# Patient Record
Sex: MALE | Race: WHITE | URBAN - METROPOLITAN AREA
[De-identification: names, ages, dates, MRNs, and addresses within clinical notes are randomized per-mention and may not be internally consistent; named-entity substitution may affect disease eponyms.]

---

## 2017-10-11 ENCOUNTER — APPOINTMENT (OUTPATIENT)
Dept: URBAN - METROPOLITAN AREA SURGERY 9 | Age: 82
Setting detail: DERMATOLOGY
End: 2017-10-11

## 2017-10-11 DIAGNOSIS — L57.8 OTHER SKIN CHANGES DUE TO CHRONIC EXPOSURE TO NONIONIZING RADIATION: ICD-10-CM

## 2017-10-11 PROBLEM — C44.329 SQUAMOUS CELL CARCINOMA OF SKIN OF OTHER PARTS OF FACE: Status: ACTIVE | Noted: 2017-10-11

## 2017-10-11 PROBLEM — Z85.828 PERSONAL HISTORY OF OTHER MALIGNANT NEOPLASM OF SKIN: Status: ACTIVE | Noted: 2017-10-11

## 2017-10-11 PROBLEM — C44.311 BASAL CELL CARCINOMA OF SKIN OF NOSE: Status: ACTIVE | Noted: 2017-10-11

## 2017-10-11 PROCEDURE — OTHER COUNSELING: OTHER

## 2017-10-11 PROCEDURE — 17311 MOHS 1 STAGE H/N/HF/G: CPT | Mod: 76

## 2017-10-11 PROCEDURE — OTHER MOHS SURGERY: OTHER

## 2017-10-11 PROCEDURE — 17312 MOHS ADDL STAGE: CPT

## 2017-10-11 PROCEDURE — OTHER RETURN TO REFERRING PROVIDER: OTHER

## 2017-10-11 PROCEDURE — OTHER PRESCRIPTION: OTHER

## 2017-10-11 PROCEDURE — 99202 OFFICE O/P NEW SF 15 MIN: CPT | Mod: 25

## 2017-10-11 PROCEDURE — 14060 TIS TRNFR E/N/E/L 10 SQ CM/<: CPT

## 2017-10-11 PROCEDURE — OTHER CONSULTATION FOR MOHS SURGERY: OTHER

## 2017-10-11 PROCEDURE — 15260 FTH/GFT FR N/E/E/L 20 SQCM/<: CPT

## 2017-10-11 PROCEDURE — 17311 MOHS 1 STAGE H/N/HF/G: CPT

## 2017-10-11 PROCEDURE — 13132 CMPLX RPR F/C/C/M/N/AX/G/H/F: CPT | Mod: 59

## 2017-10-11 RX ORDER — DOXYCYCLINE HYCLATE 100 MG/1
CAPSULE, GELATIN COATED ORAL TWICE DAILY
Qty: 14 | Refills: 0 | Status: ERX | COMMUNITY
Start: 2017-10-11

## 2017-10-11 ASSESSMENT — LOCATION SIMPLE DESCRIPTION DERM: LOCATION SIMPLE: RIGHT CHEEK

## 2017-10-11 ASSESSMENT — LOCATION ZONE DERM: LOCATION ZONE: FACE

## 2017-10-11 ASSESSMENT — LOCATION DETAILED DESCRIPTION DERM: LOCATION DETAILED: RIGHT INFERIOR CENTRAL MALAR CHEEK

## 2017-10-11 NOTE — PROCEDURE: CONSULTATION FOR MOHS SURGERY
Detail Level: Detailed
Incorporate Mauc In Note: No
X Size Of Lesion In Cm (Optional): 0
Size Of Lesion: -

## 2017-10-11 NOTE — PROCEDURE: MOHS SURGERY
Body Location Override (Optional - Billing Will Still Be Based On Selected Body Map Location If Applicable): left anterior nasal ala

## 2017-10-11 NOTE — PROCEDURE: MOHS SURGERY
Post-Care Instructions: Written and verbal instructions for wound care for taped wound care/nylon suture reviewed with patient

## 2017-10-11 NOTE — PROCEDURE: MOHS SURGERY
Body Location Override (Optional - Billing Will Still Be Based On Selected Body Map Location If Applicable): right mid cheek

## 2019-09-18 ENCOUNTER — HOSPITAL ENCOUNTER (INPATIENT)
Age: 84
LOS: 2 days | Discharge: HOME | DRG: 301 | End: 2019-09-20
Attending: EMERGENCY MEDICINE | Admitting: PSYCHIATRY & NEUROLOGY
Payer: MEDICARE

## 2019-09-18 ENCOUNTER — APPOINTMENT (OUTPATIENT)
Dept: CT IMAGING | Age: 84
DRG: 301 | End: 2019-09-18
Payer: MEDICARE

## 2019-09-18 DIAGNOSIS — I77.71 INTERNAL CAROTID ARTERY DISSECTION (HCC): ICD-10-CM

## 2019-09-18 DIAGNOSIS — H53.2 DIPLOPIA: Primary | ICD-10-CM

## 2019-09-18 LAB
% CKMB: 4.8 % (ref 0–3.5)
ABSOLUTE EOS #: 0.15 K/UL (ref 0–0.44)
ABSOLUTE IMMATURE GRANULOCYTE: 0.03 K/UL (ref 0–0.3)
ABSOLUTE LYMPH #: 1.39 K/UL (ref 1.1–3.7)
ABSOLUTE MONO #: 0.68 K/UL (ref 0.1–1.2)
ANION GAP SERPL CALCULATED.3IONS-SCNC: 14 MMOL/L (ref 9–17)
BASOPHILS # BLD: 1 % (ref 0–2)
BASOPHILS ABSOLUTE: 0.05 K/UL (ref 0–0.2)
BUN BLDV-MCNC: 22 MG/DL (ref 8–23)
BUN/CREAT BLD: ABNORMAL (ref 9–20)
CALCIUM SERPL-MCNC: 8.8 MG/DL (ref 8.6–10.4)
CHLORIDE BLD-SCNC: 104 MMOL/L (ref 98–107)
CK MB: 3.7 NG/ML
CKMB INTERPRETATION: ABNORMAL
CO2: 22 MMOL/L (ref 20–31)
CREAT SERPL-MCNC: 0.55 MG/DL (ref 0.7–1.2)
DIFFERENTIAL TYPE: ABNORMAL
EOSINOPHILS RELATIVE PERCENT: 3 % (ref 1–4)
GFR AFRICAN AMERICAN: >60 ML/MIN
GFR NON-AFRICAN AMERICAN: >60 ML/MIN
GFR SERPL CREATININE-BSD FRML MDRD: ABNORMAL ML/MIN/{1.73_M2}
GFR SERPL CREATININE-BSD FRML MDRD: ABNORMAL ML/MIN/{1.73_M2}
GLUCOSE BLD-MCNC: 98 MG/DL (ref 70–99)
HCT VFR BLD CALC: 36.4 % (ref 40.7–50.3)
HEMOGLOBIN: 11.6 G/DL (ref 13–17)
IMMATURE GRANULOCYTES: 1 %
INR BLD: 3.1
LYMPHOCYTES # BLD: 23 % (ref 24–43)
MCH RBC QN AUTO: 31.9 PG (ref 25.2–33.5)
MCHC RBC AUTO-ENTMCNC: 31.9 G/DL (ref 28.4–34.8)
MCV RBC AUTO: 100 FL (ref 82.6–102.9)
MONOCYTES # BLD: 11 % (ref 3–12)
MYOGLOBIN: 50 NG/ML (ref 28–72)
NRBC AUTOMATED: 0 PER 100 WBC
PARTIAL THROMBOPLASTIN TIME: 34.3 SEC (ref 20.5–30.5)
PDW BLD-RTO: 15.2 % (ref 11.8–14.4)
PLATELET # BLD: 194 K/UL (ref 138–453)
PLATELET ESTIMATE: ABNORMAL
PMV BLD AUTO: 9.9 FL (ref 8.1–13.5)
POTASSIUM SERPL-SCNC: 4.1 MMOL/L (ref 3.7–5.3)
PROTHROMBIN TIME: 30.1 SEC (ref 9–12)
RBC # BLD: 3.64 M/UL (ref 4.21–5.77)
RBC # BLD: ABNORMAL 10*6/UL
SEG NEUTROPHILS: 61 % (ref 36–65)
SEGMENTED NEUTROPHILS ABSOLUTE COUNT: 3.77 K/UL (ref 1.5–8.1)
SODIUM BLD-SCNC: 140 MMOL/L (ref 135–144)
TOTAL CK: 77 U/L (ref 39–308)
TROPONIN INTERP: ABNORMAL
TROPONIN T: ABNORMAL NG/ML
TROPONIN, HIGH SENSITIVITY: 66 NG/L (ref 0–22)
WBC # BLD: 6.1 K/UL (ref 3.5–11.3)
WBC # BLD: ABNORMAL 10*3/UL

## 2019-09-18 PROCEDURE — 99285 EMERGENCY DEPT VISIT HI MDM: CPT

## 2019-09-18 PROCEDURE — 93005 ELECTROCARDIOGRAM TRACING: CPT | Performed by: STUDENT IN AN ORGANIZED HEALTH CARE EDUCATION/TRAINING PROGRAM

## 2019-09-18 PROCEDURE — 85730 THROMBOPLASTIN TIME PARTIAL: CPT

## 2019-09-18 PROCEDURE — 80048 BASIC METABOLIC PNL TOTAL CA: CPT

## 2019-09-18 PROCEDURE — 99222 1ST HOSP IP/OBS MODERATE 55: CPT | Performed by: PSYCHIATRY & NEUROLOGY

## 2019-09-18 PROCEDURE — 85025 COMPLETE CBC W/AUTO DIFF WBC: CPT

## 2019-09-18 PROCEDURE — 82550 ASSAY OF CK (CPK): CPT

## 2019-09-18 PROCEDURE — 84484 ASSAY OF TROPONIN QUANT: CPT

## 2019-09-18 PROCEDURE — 82553 CREATINE MB FRACTION: CPT

## 2019-09-18 PROCEDURE — 2060000000 HC ICU INTERMEDIATE R&B

## 2019-09-18 PROCEDURE — 85610 PROTHROMBIN TIME: CPT

## 2019-09-18 PROCEDURE — 83874 ASSAY OF MYOGLOBIN: CPT

## 2019-09-18 PROCEDURE — 70450 CT HEAD/BRAIN W/O DYE: CPT

## 2019-09-19 ENCOUNTER — APPOINTMENT (OUTPATIENT)
Dept: MRI IMAGING | Age: 84
DRG: 301 | End: 2019-09-19
Payer: MEDICARE

## 2019-09-19 LAB
CHOLESTEROL/HDL RATIO: 2.2
CHOLESTEROL: 100 MG/DL
EKG ATRIAL RATE: 394 BPM
EKG Q-T INTERVAL: 414 MS
EKG QRS DURATION: 114 MS
EKG QTC CALCULATION (BAZETT): 423 MS
EKG R AXIS: -52 DEGREES
EKG T AXIS: 62 DEGREES
EKG VENTRICULAR RATE: 63 BPM
ESTIMATED AVERAGE GLUCOSE: 114 MG/DL
HBA1C MFR BLD: 5.6 % (ref 4–6)
HCT VFR BLD CALC: 34.1 % (ref 40.7–50.3)
HDLC SERPL-MCNC: 45 MG/DL
HEMOGLOBIN: 10.7 G/DL (ref 13–17)
INR BLD: 3.5
LDL CHOLESTEROL: 38 MG/DL (ref 0–130)
MCH RBC QN AUTO: 32.3 PG (ref 25.2–33.5)
MCHC RBC AUTO-ENTMCNC: 31.4 G/DL (ref 28.4–34.8)
MCV RBC AUTO: 103 FL (ref 82.6–102.9)
NRBC AUTOMATED: 0 PER 100 WBC
PDW BLD-RTO: 15.2 % (ref 11.8–14.4)
PLATELET # BLD: 170 K/UL (ref 138–453)
PMV BLD AUTO: 9.9 FL (ref 8.1–13.5)
PROTHROMBIN TIME: 33.5 SEC (ref 9–12)
RBC # BLD: 3.31 M/UL (ref 4.21–5.77)
TRIGL SERPL-MCNC: 84 MG/DL
VLDLC SERPL CALC-MCNC: NORMAL MG/DL (ref 1–30)
WBC # BLD: 4.4 K/UL (ref 3.5–11.3)

## 2019-09-19 PROCEDURE — 99233 SBSQ HOSP IP/OBS HIGH 50: CPT | Performed by: PSYCHIATRY & NEUROLOGY

## 2019-09-19 PROCEDURE — 83036 HEMOGLOBIN GLYCOSYLATED A1C: CPT

## 2019-09-19 PROCEDURE — 85027 COMPLETE CBC AUTOMATED: CPT

## 2019-09-19 PROCEDURE — 80061 LIPID PANEL: CPT

## 2019-09-19 PROCEDURE — 70551 MRI BRAIN STEM W/O DYE: CPT

## 2019-09-19 PROCEDURE — 6370000000 HC RX 637 (ALT 250 FOR IP): Performed by: STUDENT IN AN ORGANIZED HEALTH CARE EDUCATION/TRAINING PROGRAM

## 2019-09-19 PROCEDURE — 70544 MR ANGIOGRAPHY HEAD W/O DYE: CPT

## 2019-09-19 PROCEDURE — 2060000000 HC ICU INTERMEDIATE R&B

## 2019-09-19 PROCEDURE — 97166 OT EVAL MOD COMPLEX 45 MIN: CPT

## 2019-09-19 PROCEDURE — 70547 MR ANGIOGRAPHY NECK W/O DYE: CPT

## 2019-09-19 PROCEDURE — 99223 1ST HOSP IP/OBS HIGH 75: CPT | Performed by: PSYCHIATRY & NEUROLOGY

## 2019-09-19 PROCEDURE — 36415 COLL VENOUS BLD VENIPUNCTURE: CPT

## 2019-09-19 PROCEDURE — 97530 THERAPEUTIC ACTIVITIES: CPT

## 2019-09-19 PROCEDURE — 97162 PT EVAL MOD COMPLEX 30 MIN: CPT

## 2019-09-19 PROCEDURE — 85610 PROTHROMBIN TIME: CPT

## 2019-09-19 PROCEDURE — 2580000003 HC RX 258: Performed by: STUDENT IN AN ORGANIZED HEALTH CARE EDUCATION/TRAINING PROGRAM

## 2019-09-19 PROCEDURE — 97535 SELF CARE MNGMENT TRAINING: CPT

## 2019-09-19 PROCEDURE — 93010 ELECTROCARDIOGRAM REPORT: CPT | Performed by: INTERNAL MEDICINE

## 2019-09-19 RX ORDER — SODIUM CHLORIDE 0.9 % (FLUSH) 0.9 %
10 SYRINGE (ML) INJECTION PRN
Status: DISCONTINUED | OUTPATIENT
Start: 2019-09-19 | End: 2019-09-20 | Stop reason: HOSPADM

## 2019-09-19 RX ORDER — QUINAPRIL 10 MG/1
10 TABLET ORAL NIGHTLY
Status: DISCONTINUED | OUTPATIENT
Start: 2019-09-19 | End: 2019-09-19

## 2019-09-19 RX ORDER — FUROSEMIDE 20 MG/1
20 TABLET ORAL DAILY
Status: DISCONTINUED | OUTPATIENT
Start: 2019-09-19 | End: 2019-09-20 | Stop reason: HOSPADM

## 2019-09-19 RX ORDER — ATORVASTATIN CALCIUM 10 MG/1
10 TABLET, FILM COATED ORAL DAILY
Status: DISCONTINUED | OUTPATIENT
Start: 2019-09-19 | End: 2019-09-20 | Stop reason: HOSPADM

## 2019-09-19 RX ORDER — CHLORAL HYDRATE 500 MG
2000 CAPSULE ORAL 3 TIMES DAILY
Status: DISCONTINUED | OUTPATIENT
Start: 2019-09-19 | End: 2019-09-19

## 2019-09-19 RX ORDER — ASPIRIN 81 MG/1
81 TABLET, CHEWABLE ORAL DAILY
Status: DISCONTINUED | OUTPATIENT
Start: 2019-09-20 | End: 2019-09-20 | Stop reason: HOSPADM

## 2019-09-19 RX ORDER — LANOLIN ALCOHOL/MO/W.PET/CERES
325 CREAM (GRAM) TOPICAL
Status: DISCONTINUED | OUTPATIENT
Start: 2019-09-19 | End: 2019-09-20 | Stop reason: HOSPADM

## 2019-09-19 RX ORDER — LISINOPRIL 5 MG/1
5 TABLET ORAL NIGHTLY
Status: DISCONTINUED | OUTPATIENT
Start: 2019-09-19 | End: 2019-09-20 | Stop reason: HOSPADM

## 2019-09-19 RX ORDER — WARFARIN SODIUM 1 MG/1
1 TABLET ORAL
Status: DISCONTINUED | OUTPATIENT
Start: 2019-09-19 | End: 2019-09-19 | Stop reason: DRUGHIGH

## 2019-09-19 RX ORDER — M-VIT,TX,IRON,MINS/CALC/FOLIC 27MG-0.4MG
1 TABLET ORAL DAILY
Status: DISCONTINUED | OUTPATIENT
Start: 2019-09-19 | End: 2019-09-20 | Stop reason: HOSPADM

## 2019-09-19 RX ORDER — METOPROLOL SUCCINATE 25 MG/1
25 TABLET, EXTENDED RELEASE ORAL DAILY
Status: DISCONTINUED | OUTPATIENT
Start: 2019-09-19 | End: 2019-09-20 | Stop reason: HOSPADM

## 2019-09-19 RX ORDER — ONDANSETRON 2 MG/ML
4 INJECTION INTRAMUSCULAR; INTRAVENOUS EVERY 6 HOURS PRN
Status: DISCONTINUED | OUTPATIENT
Start: 2019-09-19 | End: 2019-09-20 | Stop reason: HOSPADM

## 2019-09-19 RX ORDER — WARFARIN SODIUM 2 MG/1
2 TABLET ORAL
Status: DISCONTINUED | OUTPATIENT
Start: 2019-09-20 | End: 2019-09-19 | Stop reason: DRUGHIGH

## 2019-09-19 RX ORDER — SODIUM CHLORIDE 0.9 % (FLUSH) 0.9 %
10 SYRINGE (ML) INJECTION EVERY 12 HOURS SCHEDULED
Status: DISCONTINUED | OUTPATIENT
Start: 2019-09-19 | End: 2019-09-20 | Stop reason: HOSPADM

## 2019-09-19 RX ORDER — DIGOXIN 250 MCG
250 TABLET ORAL DAILY
Status: DISCONTINUED | OUTPATIENT
Start: 2019-09-19 | End: 2019-09-20 | Stop reason: HOSPADM

## 2019-09-19 RX ADMIN — SODIUM CHLORIDE, PRESERVATIVE FREE 10 ML: 5 INJECTION INTRAVENOUS at 22:04

## 2019-09-19 RX ADMIN — ATORVASTATIN CALCIUM 10 MG: 10 TABLET, FILM COATED ORAL at 08:27

## 2019-09-19 RX ADMIN — LISINOPRIL 5 MG: 5 TABLET ORAL at 22:07

## 2019-09-19 RX ADMIN — MULTIPLE VITAMINS W/ MINERALS TAB 1 TABLET: TAB at 08:27

## 2019-09-19 RX ADMIN — FUROSEMIDE 20 MG: 20 TABLET ORAL at 08:27

## 2019-09-19 RX ADMIN — FERROUS SULFATE TAB EC 325 MG (65 MG FE EQUIVALENT) 325 MG: 325 (65 FE) TABLET DELAYED RESPONSE at 08:27

## 2019-09-19 ASSESSMENT — PAIN SCALES - GENERAL
PAINLEVEL_OUTOF10: 0

## 2019-09-19 ASSESSMENT — ENCOUNTER SYMPTOMS
SHORTNESS OF BREATH: 0
COUGH: 0
EYE PAIN: 0
VOMITING: 0
SORE THROAT: 0
PHOTOPHOBIA: 0
RHINORRHEA: 0
EYE REDNESS: 0
ABDOMINAL PAIN: 0
GASTROINTESTINAL NEGATIVE: 1
DIARRHEA: 0
BACK PAIN: 0
NAUSEA: 0
RESPIRATORY NEGATIVE: 1
CONSTIPATION: 0

## 2019-09-19 ASSESSMENT — VISUAL ACUITY: VA_NORMAL: 1

## 2019-09-19 NOTE — CONSULTS
Endovascular Note    []   Stroke Alert          []   Stroke Priority          [x]    Stroke Consult     9/19/2019 4:27 AM    Pt Name: Jennifer Kellogg  MRN: [id number]  YOB: 1934  Date of evaluation: 9/19/2019  Primary Care Physician: DO Alex Fernandez Renato Weber is a 80 y.o. male who presents with past medical history of htn, hyperlipidemia, history of blood clots, back pain, A. fib came as a transfer from [facility] to acute onset of double vision. Patient states that he was gardening in the morning when he noticed double vision and went to the hospital.  [facility] which showed carotid dissection patient was transferred to [facility] for further management. Patient is on warfarin for A. fib with INR 3.4. On exam patient states that he does still experience double vision which is present all the time when he looks far away, he describes seeing two instead of one however no diplopia is seen on nearby objects. Patient denies any numbness tingling facial droop slurred speech, headache or gait problems.                       Allergies  has No Known Allergies.     Medications  Home Medications           Prior to Admission medications    Medication Sig Start Date End Date Taking?  Authorizing Provider   warfarin (COUMADIN) 5 MG injection Take 5 mg by mouth daily       Historical Provider, MD   atorvastatin (LIPITOR) 10 MG tablet Take 10 mg by mouth daily       Historical Provider, MD   metoprolol (TOPROL-XL) 25 MG XL tablet Take 25 mg by mouth daily       Historical Provider, MD   furosemide (LASIX) 20 MG tablet Take 20 mg by mouth daily       Historical Provider, MD   digoxin (LANOXIN) 250 MCG tablet Take 250 mcg by mouth daily       Historical Provider, MD   Omega-3 Fatty Acids (FISH OIL) 1000 MG CAPS Take 2,000 mg by mouth 3 times daily       Historical Provider, MD   Multiple Vitamins-Minerals (THERAPEUTIC MULTIVITAMIN-MINERALS) tablet Take 1 tablet by degrees for full 10 seconds  5b. Motor right arm:  0 - no drift, limb holds 90 (or 45) degrees for full 10 seconds  6a. Motor left le - no drift; leg holds 30 degree position for full 5 seconds  6b. Motor right le - no drift; leg holds 30 degree position for full 5 seconds  7. Limb Ataxia:  0 - absent  8. Sensory:  0 - normal; no sensory loss  9. Best Language:  0 - no aphasia, normal  10. Dysarthria:  0 - normal  11. Extinction and Inattention:  0 - no abnormality     TOTAL:  0        ABCD2 Score  (Estimate Risk of Stroke after TIA)   POINTS   Age     < 60   ? 60       [] 0  [x] 1   BP:      SBP <140 or DBP < 90   SBP ? 140 or DBP ? 90         [] 0  [x] 1   Clinical Features of TIA      Other Symptoms                 Speech Disturbances W/O Weakness   Unilateral Weakness       [x] 0  [] 1  [] 2   Duration of symptoms      < 10 Minutes                                     10-59 Minutes   ?  61 Minutes       [] 0  [] 1  [x] 2   Diabetes      No                    Yes       [x] 0  [] 1   TOTAL 4   0-3 Points: Low Risk -> Work up could be done OPD              2-Day Stroke Risk: 1%              7- Day Stroke Risk: 1.2%              90 Days Stroke Risk: 3.1%     4-5 Points: Moderate Risk              2-Day Stroke Risk: 4.1%              7- Day Stroke Risk: 5.9%               90 Days Stroke Risk: 9.8%     6-7 Points: High Risk -> Warrant Admission for Workup              2-Day Stroke Risk: 8.1%              7- Day Stroke Risk: 11.7%              90 Days Stroke Risk: 17.8%           Imaging:  CT brain without contrast:  ordered  CTA head/neck with and without contrast:  Done at outlying facility was positive for internal carotid artery dissection  MRI brain without contrast (limited stroke protocol):  ordered        ASSESSMENT RECOMMENDATIONS:  54-year-old male with past medical history of htn, hyperlipidemia, history of blood clots, back pain, A. fib came as a transfer from [facility] to [facility]

## 2019-09-19 NOTE — ED NOTES
Dr. Khan Bending and Dr. Eduardo Quiñonez at bedside     Cathy Mccallum, [address] [facility]  09/18/19 3777

## 2019-09-19 NOTE — ED PROVIDER NOTES
Emergency Medicine Attending Note    I have seen and examined the patient in conjunction with the Resident/MLP. Please see my key portion documented:      I agree with the assessment and plan as discussed with Dr. Mj Chaney. Electronically signed:  Ludin Luz M.D.            Merlin Bean MD  09/18/19 3531

## 2019-09-19 NOTE — ED NOTES
Critical troponin of 66 received from lab. Dr. Ashley Holt informed.       Haze Gottron, RN  09/18/19 2030

## 2019-09-20 VITALS
RESPIRATION RATE: 17 BRPM | SYSTOLIC BLOOD PRESSURE: 124 MMHG | WEIGHT: 185.85 LBS | DIASTOLIC BLOOD PRESSURE: 55 MMHG | HEIGHT: 72 IN | BODY MASS INDEX: 25.17 KG/M2 | HEART RATE: 56 BPM | OXYGEN SATURATION: 96 % | TEMPERATURE: 97.9 F

## 2019-09-20 LAB
INR BLD: 2.2
PROTHROMBIN TIME: 22.1 SEC (ref 9–12)

## 2019-09-20 PROCEDURE — 2580000003 HC RX 258: Performed by: STUDENT IN AN ORGANIZED HEALTH CARE EDUCATION/TRAINING PROGRAM

## 2019-09-20 PROCEDURE — 85610 PROTHROMBIN TIME: CPT

## 2019-09-20 PROCEDURE — 97530 THERAPEUTIC ACTIVITIES: CPT

## 2019-09-20 PROCEDURE — 6370000000 HC RX 637 (ALT 250 FOR IP): Performed by: STUDENT IN AN ORGANIZED HEALTH CARE EDUCATION/TRAINING PROGRAM

## 2019-09-20 PROCEDURE — 97116 GAIT TRAINING THERAPY: CPT

## 2019-09-20 PROCEDURE — G0008 ADMIN INFLUENZA VIRUS VAC: HCPCS | Performed by: PSYCHIATRY & NEUROLOGY

## 2019-09-20 PROCEDURE — 90686 IIV4 VACC NO PRSV 0.5 ML IM: CPT | Performed by: PSYCHIATRY & NEUROLOGY

## 2019-09-20 PROCEDURE — 97110 THERAPEUTIC EXERCISES: CPT

## 2019-09-20 PROCEDURE — 36415 COLL VENOUS BLD VENIPUNCTURE: CPT

## 2019-09-20 PROCEDURE — 99239 HOSP IP/OBS DSCHRG MGMT >30: CPT | Performed by: NURSE PRACTITIONER

## 2019-09-20 PROCEDURE — 6360000002 HC RX W HCPCS: Performed by: PSYCHIATRY & NEUROLOGY

## 2019-09-20 RX ORDER — ASPIRIN 81 MG/1
81 TABLET, CHEWABLE ORAL DAILY
Qty: 28 TABLET | Refills: 0 | Status: SHIPPED | OUTPATIENT
Start: 2019-09-21

## 2019-09-20 RX ORDER — WARFARIN SODIUM 2 MG/1
2 TABLET ORAL
Status: COMPLETED | OUTPATIENT
Start: 2019-09-20 | End: 2019-09-20

## 2019-09-20 RX ORDER — WARFARIN SODIUM 2 MG/1
TABLET ORAL
Qty: 12 TABLET | Refills: 5 | Status: SHIPPED | OUTPATIENT
Start: 2019-09-20

## 2019-09-20 RX ORDER — WARFARIN SODIUM 1 MG/1
TABLET ORAL
Qty: 32 TABLET | Refills: 3 | Status: SHIPPED | OUTPATIENT
Start: 2019-09-20

## 2019-09-20 RX ADMIN — WARFARIN SODIUM 2 MG: 2 TABLET ORAL at 17:05

## 2019-09-20 RX ADMIN — ASPIRIN 81 MG: 81 TABLET, CHEWABLE ORAL at 08:28

## 2019-09-20 RX ADMIN — ATORVASTATIN CALCIUM 10 MG: 10 TABLET, FILM COATED ORAL at 08:29

## 2019-09-20 RX ADMIN — MULTIPLE VITAMINS W/ MINERALS TAB 1 TABLET: TAB at 08:28

## 2019-09-20 RX ADMIN — SODIUM CHLORIDE, PRESERVATIVE FREE 10 ML: 5 INJECTION INTRAVENOUS at 08:34

## 2019-09-20 RX ADMIN — INFLUENZA A VIRUS A/MICHIGAN/45/2015 X-275 (H1N1) ANTIGEN (FORMALDEHYDE INACTIVATED), INFLUENZA A VIRUS A/SINGAPORE/INFIMH-16-0019/2016 IVR-186 (H3N2) ANTIGEN (FORMALDEHYDE INACTIVATED), INFLUENZA B VIRUS B/PHUKET/3073/2013 ANTIGEN (FORMALDEHYDE INACTIVATED), AND INFLUENZA B VIRUS B/MARYLAND/15/2016 BX-69A ANTIGEN (FORMALDEHYDE INACTIVATED) 0.5 ML: 15; 15; 15; 15 INJECTION, SUSPENSION INTRAMUSCULAR at 08:29

## 2019-09-20 RX ADMIN — FERROUS SULFATE TAB EC 325 MG (65 MG FE EQUIVALENT) 325 MG: 325 (65 FE) TABLET DELAYED RESPONSE at 08:29

## 2019-09-20 ASSESSMENT — PAIN SCALES - GENERAL
PAINLEVEL_OUTOF10: 0

## 2019-09-20 NOTE — PLAN OF CARE
Problem: Infection:  Goal: Will remain free from infection  Description  Will remain free from infection  9/20/2019 1403 by Tan Everett RN  Outcome: Completed  9/20/2019 0213 by Riri Toro RN  Outcome: Ongoing     Problem: Safety:  Goal: Free from accidental physical injury  Description  Free from accidental physical injury  9/20/2019 1403 by Tan Everett RN  Outcome: Completed  9/20/2019 0213 by Riri Toro RN  Outcome: Ongoing  Goal: Free from intentional harm  Description  Free from intentional harm  9/20/2019 1403 by Tan Everett RN  Outcome: Completed  9/20/2019 0213 by Riri Toro RN  Outcome: Ongoing     Problem: Daily Care:  Goal: Daily care needs are met  Description  Daily care needs are met  9/20/2019 1403 by Tan Everett RN  Outcome: Completed  9/20/2019 0213 by Riri Toro RN  Outcome: Ongoing     Problem: Safety:  Goal: Free from intentional harm  Description  Free from intentional harm  9/20/2019 1403 by Tan Everett RN  Outcome: Completed  9/20/2019 0213 by Riri Toro RN  Outcome: Ongoing     Problem: Discharge Planning:  Goal: Patients continuum of care needs are met  Description  Patients continuum of care needs are met  9/20/2019 1403 by Tan Everett RN  Outcome: Completed  9/20/2019 0213 by Riri Toro RN  Outcome: Ongoing     Problem: Falls - Risk of:  Goal: Will remain free from falls  Description  Will remain free from falls  9/20/2019 1403 by Tan Everett RN  Outcome: Completed  9/20/2019 0213 by Riri Toro RN  Outcome: Ongoing  Goal: Absence of physical injury  Description  Absence of physical injury  9/20/2019 1403 by Tan Everett RN  Outcome: Completed  9/20/2019 0213 by Riri Toro RN  Outcome: Ongoing     Problem: Falls - Risk of:  Goal: Absence of physical injury  Description  Absence of physical injury  9/20/2019 1403 by Tan Everett RN  Outcome: Completed  9/20/2019 0213 by

## 2019-09-20 NOTE — PROGRESS NOTES
Pharmacy Note  Warfarin Consult follow-up      Recent Labs     09/19/19  0721   INR 3.5     Recent Labs     09/18/19  2207 09/19/19  0721   HGB 11.6* 10.7*   HCT 36.4* 34.1*    170       Significant Drug-Drug Interactions:  New warfarin drug-drug interactions: none  Discontinued drug-drug interactions: none  Current warfarin drug-drug interactions: None      Date INR Dose   9/18/19 3.1 Held   9/19/19 3.5 Held       Notes:     Continue to hold warfarin for elevated INR. Daily PT/INR while inpatient. [address] [place] [address]  Ph., CACP, Clinical Pharmacist  Anticoagulation Services, [facility]  9/19/2019  3:23 PM
Physical Therapy  Facility/Department: [facility] 3  Daily Treatment Note  NAME: Ilan Weber  : 1934  MRN: [id number]    Date of Service: 2019    Discharge Recommendations:     Further therapy recommended at discharge. PT Equipment Recommendations  Equipment Needed: No    Assessment     Body structures, Functions, Activity limitations: Decreased functional mobility ; Decreased endurance;Decreased safe awareness;Decreased balance  Assessment: The pt required Min A for supine to sit and CGA for ambulation d/t balance deficits and high fall risk. The pt currently requires physical assistance for all mobility and will require additional skilled PT. Prognosis: Good  Decision Making: Medium Complexity  PT Education: Goals;PT Role;Plan of Care;Transfer Training;General Safety  Barriers to Learning: lacks insights to deficits  REQUIRES PT FOLLOW UP: Yes  Activity Tolerance  Activity Tolerance: Patient limited by endurance           Patient Diagnosis(es): The encounter diagnosis was Diplopia. has a past medical history of Back pain, Hx of blood clots, Hyperlipidemia, and Hypertension. has a past surgical history that includes Colonoscopy; joint replacement; Dilatation, esophagus; vascular surgery; and Kyphosis surgery (11/10/15). Restrictions  Restrictions/Precautions  Restrictions/Precautions: General Precautions, Fall Risk, Up as Tolerated  Required Braces or Orthoses?: No  Position Activity Restriction  Other position/activity restrictions: ICA dissection, acute diplopia  Subjective   Pt sitting up in his bed.   Pt has no c/o pain   Orientation    Overall Orientation Status: Within Functional Limits  Objective     Bed mobility  Supine to Sit: Min. assistance(for trunk progression)  Sit to Supine: Contact guard assistance  Scooting: Contact guard assistance  Comment: HOB elevated ~30 degrees, use of bed rails, VC's for safety and waiting for assistance  Transfers  Sit to Stand: Contact guard
Smoking Cessation - topics covered   []  Health Risks  []  Benefits of Quitting   []  Smoking Cessation  []  Patient has no history of tobacco use  [x]  Patient is former smoker. Patient quit in 2011. [x]  No need for tobacco cessation education. []  Booklet given  []  Patient verbalizes understanding. []  Patient denies need for tobacco cessation education. []  Unable to meet with patient today. Will follow up as able.   Isidoro Hairston  10:14 AM
Talked with endovascular fellow , as per them patient to be on ASA 81 x4 week, cta in 3 week and follow -up with  in 4 week,to be seen by opthalmology inpatient and then ok to be discharged
LUE  Strength LUE: WFL  Tone RLE  RLE Tone: Normotonic  Tone LLE  LLE Tone: Normotonic  Motor Control  Gross Motor?: WFL  Sensation  Overall Sensation Status: WFL(Pt denies numbness/ tingling )  Bed mobility  Supine to Sit: Moderate assistance(for trunk progression)  Sit to Supine: Contact guard assistance  Scooting: Contact guard assistance  Comment: HOB elevated ~45 degrees, use of bed rails, VC's for safety and waiting for assistance  Transfers  Sit to Stand: Contact guard assistance  Stand to sit: Contact guard assistance  Stand Pivot Transfers: Contact guard assistance  Comment: CGA for safety, VC's for UE placement with poor return demo, pt impulsive and reaching outside JOHN for RW to attempt standing  Ambulation  Ambulation?: Yes  Ambulation 1  Surface: level tile  Device: Rolling Walker  Assistance: Contact guard assistance  Quality of Gait: flexed posture, unsafe summer, poor safety awareness, wide JOHN  Distance: 30ft  Comments: Max VC's to maintain RW in JOHN with poor carryover.   Stairs/Curb  Stairs?: No     Balance  Posture: Fair  Sitting - Static: Good  Sitting - Dynamic: Good;-  Standing - Static: Fair;+  Standing - Dynamic: Fair  Comments: standing balance assessed with RW        Plan   Plan  Times per week: 5x/wk  Current Treatment Recommendations: Strengthening, Balance Training, Functional Mobility Training, Transfer Training, Endurance Training, Patient/Caregiver Education & Training, Safety Education & Training, Home Exercise Program, Gait Training, Stair training  Safety Devices  Type of devices: Left in bed, Gait belt, Call light within reach, Bed alarm in place, Nurse notified  Restraints  Initially in place: No      AM-PAC Score  AM-PAC Inpatient Mobility Raw Score : 16 (09/19/19 1529)  AM-PAC Inpatient T-Scale Score : 40.78 (09/19/19 1529)  Mobility Inpatient CMS 0-100% Score: 54.16 (09/19/19 1529)  Mobility Inpatient CMS G-Code Modifier : CK (09/19/19 1529)          Goals  Short term
Treatment Recommendations: Functional Mobility Training, Endurance Training, Safety Education & Training, Patient/Caregiver Education & Training, Equipment Evaluation, Education, & procurement, Self-Care / ADL    AM-PAC Score  AM-PAC Inpatient Daily Activity Raw Score: 18 (09/19/19 1209)  AM-PAC Inpatient ADL T-Scale Score : 38.66 (09/19/19 1209)  ADL Inpatient CMS 0-100% Score: 46.65 (09/19/19 1209)  ADL Inpatient CMS G-Code Modifier : CK (09/19/19 1209)    Goals  Short term goals  Time Frame for Short term goals: by discharge, pt will  Short term goal 1: demo I in UE ADL activities   Short term goal 2: demo MI in LE ADL activities with AD as needed  Short term goal 3: demo I in functional transfers/ mobility with use of RW and good safety awareness during functional activities   Short term goal 4: demo understanding and I use of EC/WS and fall prevention tech during functional activities   Short term goal 5: demo increased activity tolerance of 30+ min to assist with ADL/ functional activities   Patient Goals   Patient goals : to go home       Therapy Time   Individual Concurrent Group Co-treatment   Time In 1027         Time Out 1055         Minutes 28          See above for LOF. RN reports patient is medically stable for therapy treatment this date. Chart reviewed prior to treatment and patient is agreeable for therapy. All lines intact and patient positioned comfortably at end of treatment. All patient needs addressed prior to ending therapy session.          Co-evaluation with PT   Grace Sheffield OTR/L

## 2019-09-20 NOTE — DISCHARGE SUMMARY
arise from the subclavian arteries and are normal in caliber without evidence of flow limiting stenosis. MRA HEAD: ANTERIOR CIRCULATION: The internal carotid arteries are normal in course and caliber without focal stenosis. The anterior cerebral and middle cerebral arteries demonstrate no focal stenosis. POSTERIOR CIRCULATION: The posterior cerebral arteries demonstrate no focal stenosis. The vertebral and basilar arteries appear unremarkable. No evidence of acute stroke, midline shift or mass effect. Moderate chronic small ischemic disease and age related involutional changes intracranially. Evidence of chronic lacune infarcts but in both basal ganglia regions notably involving the right caudate head. 50% narrowing identified involving the right proximal ICA with luminal irregularity. Could be related to flow related artifacts versus dissection per the provided clinical history. CT angiogram may be beneficial if there is persistent concern. Otherwise no hemodynamic stenosis or occlusion involving the cervical or intracranial arterial circulation. No T1 fat sat images were performed through the cervical vessels evaluate for dissection. Mri Brain Without Contrast    Result Date: 9/19/2019  EXAMINATION: MRI OF THE BRAIN WITHOUT CONTRAST; MRA OF THE NECK WITHOUT CONTRAST; MRA OF THE HEAD WITHOUT CONTRAST 9/19/2019 4:49 pm: TECHNIQUE: Multiplanar multisequence MRI of the brain was performed without the administration of intravenous contrast.; Multiplanar multisequence MRA of the neck was performed without the administration of intravenous contrast. Stenosis of the internal carotid arteries measured using NASCET criteria.; MRA of the head was performed utilizing time-of-flight imaging with MIP images. No intravenous contrast was administered.  COMPARISON: CT head 09/18/2019 HISTORY: ORDERING SYSTEM PROVIDED HISTORY: double vision, ica dissection FINDINGS: MRI BRAIN: INTRACRANIAL STRUCTURES/VENTRICLES: There is medications exactly as prescribed     Use eye patch alternately, switch after 2-3 hrs when awake          Discharge Medications:      Medication List      START taking these medications    aspirin 81 MG chewable tablet  Take 1 tablet by mouth daily  Start taking on:  9/21/2019     * warfarin 1 MG tablet  Commonly known as:  COUMADIN  Take 1 mg on Tuesday, Thursday, Saturday and Sunday by mouth  Replaces:  warfarin 5 MG injection     * warfarin 2 MG tablet  Commonly known as:  COUMADIN  Take 2 mg on Monday, Wednesday and Friday by mouth         * This list has 2 medication(s) that are the same as other medications prescribed for you. Read the directions carefully, and ask your doctor or other care provider to review them with you. CONTINUE taking these medications    atorvastatin 10 MG tablet  Commonly known as:  LIPITOR     fenofibrate 160 MG tablet     ferrous sulfate 325 (65 Fe) MG tablet     fish oil 1000 MG Caps     furosemide 20 MG tablet  Commonly known as:  LASIX     METAMUCIL 0.36 g Caps  Generic drug:  Psyllium     metoprolol succinate 25 MG extended release tablet  Commonly known as:  TOPROL XL     quinapril 20 MG tablet  Commonly known as:  ACCUPRIL     therapeutic multivitamin-minerals tablet        STOP taking these medications    digoxin 250 MCG tablet  Commonly known as:  LANOXIN     warfarin 5 MG injection  Commonly known as:  COUMADIN  Replaced by:  warfarin 1 MG tablet           Where to Get Your Medications      These medications were sent to [address] [place]  [address]    Phone:  [phone]   · aspirin 81 MG chewable tablet  · warfarin 1 MG tablet  · warfarin 2 MG tablet         Time Spent on discharge is  35 mins in patient examination, evaluation, counseling as well as medication reconciliation, prescriptions for required medications, discharge plan and follow up.     Electronically signed by   Naomi Moritz, APRN - CNP  9/20/2019  5:44 PM      Thank you Dr. Annel Galo DO for the opportunity to be involved in this patient's care.

## 2019-10-09 ENCOUNTER — TELEPHONE (OUTPATIENT)
Dept: NEUROLOGY | Age: 84
End: 2019-10-09

## 2019-10-15 NOTE — PROCEDURE: MOHS SURGERY
Left message for patient to return call to schedule colonoscopy or EGD. If Annika or Tonya are unavailable, please transfer to the surgery center.          Mohs Method Verbiage: An incision at a 45 degree angle following the standard Mohs approach was done and the specimen was harvested as a microscopic controlled layer.

## 2021-12-15 NOTE — PROCEDURE: MOHS SURGERY
regular Bilobed Transposition Flap Text: The defect edges were debeveled with a #15 scalpel blade.  Given the location of the defect and the proximity to free margins a bilobed transposition flap was deemed most appropriate.  Using a sterile surgical marker, an appropriate bilobe flap drawn around the defect.    The area thus outlined was incised deep to adipose tissue with a #15 scalpel blade.  The skin margins were undermined to an appropriate distance in all directions utilizing iris scissors.
